# Patient Record
Sex: MALE | Race: WHITE | NOT HISPANIC OR LATINO | Employment: UNEMPLOYED | URBAN - METROPOLITAN AREA
[De-identification: names, ages, dates, MRNs, and addresses within clinical notes are randomized per-mention and may not be internally consistent; named-entity substitution may affect disease eponyms.]

---
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
## 2021-05-04 ENCOUNTER — TELEPHONE (OUTPATIENT)
Dept: PULMONOLOGY | Facility: CLINIC | Age: 21
End: 2021-05-04

## 2021-05-04 NOTE — TELEPHONE ENCOUNTER
Obed was last seen in pulmonary clinic in 2020. He had a virtual follow up in May 2020 as well.   Since May 2020, the pulmonary team has not been able to contact him. We have attempted via phone, email and mail. Due to age and lack of follow up, the CF Team has decided to transition Obed's care.   If Obed were to call for follow up, refills or need to be seen urgently, he should be direct to the Adult CF Center ([phone]) or the [facility].     Following letter was mailed home to last known address in chart:     May 4, 2021    Dear Obed,    Thank you for allowing us to be partners with you in your CF care. Your last clinic visit was a virtual visit on 2020 and we have been unable to reach you to schedule follow up.   Moving forward, if you need any refills or need to seek care, you should contact the adult CF Center program at the [facility].    Here are important numbers for you to have:     Adult Pulmonary Nurse Triage Line: [phone]    [facility]: [phone]    New Adult Patient Pulmonary [phone]  If you have any questions about the information above, you can contact our pediatric pulmonary nurse line at [phone] or [facility] the pediatric CF - [phone].   Best of luck Obed!   Sincerely,   Your CF Care Team and the [facility]     JOHNY Serrano [facility]  Pediatric Cystic Fibrosis/Pulmonary   Pager: [phone]  Phone: [phone]  Email: [email or web address]    *NO LETTER*

## 2021-05-31 ENCOUNTER — RECORDS - HEALTHEAST (OUTPATIENT)
Dept: ADMINISTRATIVE | Facility: CLINIC | Age: 21
End: 2021-05-31